# Patient Record
Sex: FEMALE | Race: WHITE | NOT HISPANIC OR LATINO | Employment: STUDENT | URBAN - METROPOLITAN AREA
[De-identification: names, ages, dates, MRNs, and addresses within clinical notes are randomized per-mention and may not be internally consistent; named-entity substitution may affect disease eponyms.]

---

## 2019-08-19 ENCOUNTER — OFFICE VISIT (OUTPATIENT)
Dept: OBGYN CLINIC | Facility: CLINIC | Age: 19
End: 2019-08-19
Payer: COMMERCIAL

## 2019-08-19 ENCOUNTER — APPOINTMENT (OUTPATIENT)
Dept: RADIOLOGY | Facility: CLINIC | Age: 19
End: 2019-08-19
Payer: COMMERCIAL

## 2019-08-19 VITALS
BODY MASS INDEX: 21.62 KG/M2 | HEIGHT: 69 IN | DIASTOLIC BLOOD PRESSURE: 61 MMHG | SYSTOLIC BLOOD PRESSURE: 95 MMHG | WEIGHT: 146 LBS | HEART RATE: 69 BPM

## 2019-08-19 DIAGNOSIS — M25.562 LEFT KNEE PAIN, UNSPECIFIED CHRONICITY: ICD-10-CM

## 2019-08-19 DIAGNOSIS — S83.92XA SPRAIN OF LEFT KNEE, UNSPECIFIED LIGAMENT, INITIAL ENCOUNTER: Primary | ICD-10-CM

## 2019-08-19 PROCEDURE — 73562 X-RAY EXAM OF KNEE 3: CPT

## 2019-08-19 PROCEDURE — 99214 OFFICE O/P EST MOD 30 MIN: CPT | Performed by: ORTHOPAEDIC SURGERY

## 2019-08-19 NOTE — PROGRESS NOTES
Assessment/Plan:  1  Sprain of left knee, unspecified ligament, initial encounter  Ambulatory referral to Physical Therapy   2  Left knee pain, unspecified chronicity  XR knee 3 vw left non injury    Ambulatory referral to Physical Therapy       Scribe Attestation    I,:   Zhang Roger am acting as a scribe while in the presence of the attending physician :        I,:   Mathew Toussaint MD personally performed the services described in this documentation    as scribed in my presence :              Upon evaluation of Joce's left knee,I believe she has a sprain of her left knee  We did discuss the possibility of a tear in the lateral meniscus, where she reports previous diagnosis of a discoid lateral meniscus  I would like to try and treat her conservatively with 6 weeks of formal physical therapy  I would like her to follow up in 6 weeks after the completion of physical therapy  At that time we will order an MRI of left knee and discuss surgery if physical therapy was not successful  Subjective:   Darci Toussaint is a 20 y o  female who presents to the office today for evaluation of left knee  She states about 6 years ago she was seen at [facility] who diagnosed her with patellar tracking issues  They prescribed physical therapy which she states did not help  She did stop dancing because of the pain  She states that they did do an MRI and she reports that the MRI demonstrated a lateral discoid meniscus  Patient states she has been dealing with the pain for the last 6 years, however the last 2-3 weeks she has been working double shifts as a  and has been experiencing severe pain  She states that her knee feels unstable and her knee gives out  She does report a painful pop  By the end of her shift her knee is severely swollen and tender  Ibuprofen and Tylenol do not help with her pain however rest does help  She reports her pain is "deep inside the knee"    She denies any further injury or trauma to her left knee  She denies any distal paresthesias  Review of Systems   Constitutional: Negative for chills, fever and unexpected weight change  HENT: Negative for hearing loss, nosebleeds and sore throat  Eyes: Negative for pain, redness and visual disturbance  Respiratory: Negative for cough, shortness of breath and wheezing  Cardiovascular: Negative for chest pain, palpitations and leg swelling  Gastrointestinal: Negative for abdominal pain, nausea and vomiting  Endocrine: Negative for polydipsia and polyuria  Genitourinary: Negative for dysuria and hematuria  Musculoskeletal: Negative for arthralgias, joint swelling and myalgias  See HPI   Skin: Negative for rash and wound  Neurological: Negative for dizziness, numbness and headaches  Psychiatric/Behavioral: Negative for decreased concentration and suicidal ideas  The patient is nervous/anxious  History reviewed  No pertinent past medical history  Past Surgical History:   Procedure Laterality Date    SINUS SURGERY         Family History   Problem Relation Age of Onset    No Known Problems Mother     No Known Problems Father     No Known Problems Sister     No Known Problems Brother     No Known Problems Maternal Aunt     No Known Problems Maternal Uncle     No Known Problems Paternal Aunt     No Known Problems Paternal Uncle     No Known Problems Maternal Grandmother     No Known Problems Maternal Grandfather     No Known Problems Paternal Grandmother     No Known Problems Paternal Grandfather        Social History     Occupational History    Not on file   Tobacco Use    Smoking status: Never Smoker    Smokeless tobacco: Never Used   Substance and Sexual Activity    Alcohol use: Never     Frequency: Never    Drug use: Never    Sexual activity: Not on file       No current outpatient medications on file      Allergies   Allergen Reactions    Ciprofloxacin Objective:  Vitals:    08/19/19 1113   BP: 95/61   Pulse: 69       Left Knee Exam     Tenderness   The patient is experiencing tenderness in the lateral joint line and medial joint line (posterior lateral joint line)  Range of Motion   Extension: -5   Flexion: 150     Tests   Heber:  Medial - negative Lateral - negative  Varus: negative (painful) Valgus: negative (painful)  Lachman:  Anterior - negative    Posterior - negative  Drawer:  Anterior - negative     Posterior - negative    Other   Erythema: absent  Scars: absent  Sensation: normal  Pulse: present  Effusion: effusion (trace) present          Observations   Left Knee   Positive for effusion (trace)  Physical Exam   Constitutional: She is oriented to person, place, and time  She appears well-developed and well-nourished  HENT:   Head: Normocephalic and atraumatic  Eyes: Pupils are equal, round, and reactive to light  Conjunctivae are normal    Neck: Normal range of motion  Neck supple  Cardiovascular: Normal rate and intact distal pulses  Pulmonary/Chest: Effort normal  No respiratory distress  Musculoskeletal: She exhibits tenderness  She exhibits no edema or deformity  Left knee: She exhibits effusion (trace)  As noted in HPI   Neurological: She is alert and oriented to person, place, and time  Skin: Skin is warm and dry  Psychiatric: She has a normal mood and affect  Her behavior is normal    Vitals reviewed  I have personally reviewed pertinent films in PACS and my interpretation is as follows: I viewed the x-rays of the left knee obtained on August 19, 2019  The x-ray demonstrates no dislocation fracture other obvious osseous abnormalities

## 2020-03-23 ENCOUNTER — OFFICE VISIT (OUTPATIENT)
Dept: URGENT CARE | Facility: CLINIC | Age: 20
End: 2020-03-23
Payer: COMMERCIAL

## 2020-03-23 VITALS
SYSTOLIC BLOOD PRESSURE: 106 MMHG | BODY MASS INDEX: 21.31 KG/M2 | DIASTOLIC BLOOD PRESSURE: 60 MMHG | TEMPERATURE: 99.9 F | RESPIRATION RATE: 18 BRPM | WEIGHT: 140.6 LBS | HEIGHT: 68 IN | OXYGEN SATURATION: 100 % | HEART RATE: 73 BPM

## 2020-03-23 DIAGNOSIS — R39.9 UTI SYMPTOMS: Primary | ICD-10-CM

## 2020-03-23 LAB
SL AMB  POCT GLUCOSE, UA: ABNORMAL
SL AMB LEUKOCYTE ESTERASE,UA: NEGATIVE
SL AMB POCT BILIRUBIN,UA: ABNORMAL
SL AMB POCT BLOOD,UA: ABNORMAL
SL AMB POCT CLARITY,UA: ABNORMAL
SL AMB POCT COLOR,UA: YELLOW
SL AMB POCT KETONES,UA: ABNORMAL
SL AMB POCT NITRITE,UA: ABNORMAL
SL AMB POCT PH,UA: 7.5
SL AMB POCT SPECIFIC GRAVITY,UA: 1.01
SL AMB POCT URINE PROTEIN: 30
SL AMB POCT UROBILINOGEN: 0.2

## 2020-03-23 PROCEDURE — 87086 URINE CULTURE/COLONY COUNT: CPT | Performed by: NURSE PRACTITIONER

## 2020-03-23 PROCEDURE — 81002 URINALYSIS NONAUTO W/O SCOPE: CPT | Performed by: NURSE PRACTITIONER

## 2020-03-23 PROCEDURE — 99213 OFFICE O/P EST LOW 20 MIN: CPT | Performed by: NURSE PRACTITIONER

## 2020-03-23 RX ORDER — SULFAMETHOXAZOLE AND TRIMETHOPRIM 800; 160 MG/1; MG/1
1 TABLET ORAL EVERY 12 HOURS
COMMUNITY
Start: 2020-03-13

## 2020-03-23 NOTE — PROGRESS NOTES
[facility]        NAME: Anastasia Doherty is a 20 y o  female  : 2000    MRN: [id number]  DATE: 2020  TIME: 4:03 PM    Assessment and Plan   UTI symptoms [R39 9]  1  UTI symptoms  POCT urine dip    Urine culture         Patient Instructions     Stay well hydrated  Empty bladder frequently  Tylenol as needed  Follow up with primary care as needed    Follow up with PCP in 3-5 days  Proceed to  ER if symptoms worsen  Chief Complaint     Chief Complaint   Patient presents with    Possible UTI     Pt here for UTI s/s ongoing x2 days pt states last night  burning and  freq,   voiding small amounts  Pain   6/10  History of Present Illness       21 y/o female presents with UTI symptoms that began 2 days ago  She c/o burning with urination, frequency, and urgency  She denies any fevers, N/V, abdominal pain, or hematuria  She reports last week she was seen by her PCP and started on Bactrim for a UTI, but was then called and advised to stop the antibiotic due to her culture results  Review of Systems   Review of Systems   Constitutional: Negative for chills and fever  Gastrointestinal: Negative for nausea and vomiting  Genitourinary: Positive for dysuria, frequency and urgency  Negative for decreased urine volume, difficulty urinating, flank pain, hematuria, pelvic pain and vaginal discharge  Musculoskeletal: Negative for back pain           Current Medications       Current Outpatient Medications:     sulfamethoxazole-trimethoprim (BACTRIM DS) 800-160 mg per tablet, Take 1 tablet by mouth every 12 (twelve) hours, Disp: , Rfl:     Current Allergies     Allergies as of 2020 - Reviewed 2020   Allergen Reaction Noted    Ciprofloxacin Angioedema 2019            The following portions of the patient's history were reviewed and updated as appropriate: allergies, current medications, past family history, past medical history, past social history, past surgical history and problem list      Past Medical History:   Diagnosis Date    Asthma        Past Surgical History:   Procedure Laterality Date    SINUS SURGERY         Family History   Problem Relation Age of Onset    No Known Problems Mother     No Known Problems Father     No Known Problems Sister     No Known Problems Brother     No Known Problems Maternal Aunt     No Known Problems Maternal Uncle     No Known Problems Paternal Aunt     No Known Problems Paternal Uncle     No Known Problems Maternal Grandmother     No Known Problems Maternal Grandfather     No Known Problems Paternal Grandmother     No Known Problems Paternal Grandfather          Medications have been verified  Objective   /60 (BP Location: Right arm, Patient Position: Sitting, Cuff Size: Standard)   Pulse 73   Temp 99 9 °F (37 7 °C) (Tympanic)   Resp 18   Ht 5' 8" (1 727 m)   Wt 63 8 kg (140 lb 9 6 oz)   SpO2 100%   BMI 21 38 kg/m²        Physical Exam     Physical Exam   Constitutional: She is oriented to person, place, and time  Vital signs are normal  She appears well-developed and well-nourished  Cardiovascular: Normal rate, regular rhythm and normal heart sounds  Pulmonary/Chest: Effort normal and breath sounds normal    Abdominal: Soft  Normal appearance  There is no tenderness  There is no CVA tenderness  Neurological: She is alert and oriented to person, place, and time  She has normal strength  GCS eye subscore is 4  GCS verbal subscore is 5  GCS motor subscore is 6  Skin: Skin is warm and dry  Psychiatric: She has a normal mood and affect  Her speech is normal and behavior is normal    Nursing note and vitals reviewed

## 2020-03-23 NOTE — PATIENT INSTRUCTIONS
Stay well hydrated  Empty bladder frequently  Tylenol as needed  Follow up with primary care as needed

## 2020-03-24 LAB — BACTERIA UR CULT: NORMAL
